# Patient Record
Sex: MALE | Race: WHITE | Employment: FULL TIME | URBAN - METROPOLITAN AREA
[De-identification: names, ages, dates, MRNs, and addresses within clinical notes are randomized per-mention and may not be internally consistent; named-entity substitution may affect disease eponyms.]

---

## 2022-02-12 ENCOUNTER — APPOINTMENT (OUTPATIENT)
Dept: GENERAL RADIOLOGY | Age: 57
End: 2022-02-12
Payer: COMMERCIAL

## 2022-02-12 ENCOUNTER — HOSPITAL ENCOUNTER (EMERGENCY)
Age: 57
Discharge: HOME | End: 2022-02-12
Payer: COMMERCIAL

## 2022-02-12 VITALS
TEMPERATURE: 98.1 F | WEIGHT: 215 LBS | HEIGHT: 70 IN | DIASTOLIC BLOOD PRESSURE: 99 MMHG | SYSTOLIC BLOOD PRESSURE: 156 MMHG | HEART RATE: 90 BPM | OXYGEN SATURATION: 99 % | BODY MASS INDEX: 30.78 KG/M2 | RESPIRATION RATE: 14 BRPM

## 2022-02-12 DIAGNOSIS — S83.91XA SPRAIN OF RIGHT KNEE, UNSPECIFIED LIGAMENT, INITIAL ENCOUNTER: Primary | ICD-10-CM
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
## 2022-02-12 PROCEDURE — 73560 X-RAY EXAM OF KNEE 1 OR 2: CPT

## 2022-02-12 PROCEDURE — 99284 EMERGENCY DEPT VISIT MOD MDM: CPT

## 2022-02-12 PROCEDURE — 6370000000 HC RX 637 (ALT 250 FOR IP): Performed by: PHYSICIAN ASSISTANT

## 2022-02-12 RX ORDER — IBUPROFEN 600 MG/1
600 TABLET ORAL ONCE
Status: COMPLETED | OUTPATIENT
Start: 2022-02-12 | End: 2022-02-12

## 2022-02-12 RX ADMIN — IBUPROFEN 600 MG: 600 TABLET ORAL at 12:50

## 2022-02-12 ASSESSMENT — PAIN DESCRIPTION - ONSET: ONSET: ON-GOING

## 2022-02-12 ASSESSMENT — PAIN DESCRIPTION - LOCATION: LOCATION: KNEE

## 2022-02-12 ASSESSMENT — PAIN DESCRIPTION - DESCRIPTORS: DESCRIPTORS: ACHING

## 2022-02-12 ASSESSMENT — PAIN DESCRIPTION - PROGRESSION: CLINICAL_PROGRESSION: NOT CHANGED

## 2022-02-12 ASSESSMENT — PAIN DESCRIPTION - ORIENTATION: ORIENTATION: RIGHT

## 2022-02-12 ASSESSMENT — PAIN DESCRIPTION - PAIN TYPE: TYPE: ACUTE PAIN

## 2022-02-12 ASSESSMENT — PAIN DESCRIPTION - FREQUENCY: FREQUENCY: CONTINUOUS

## 2022-02-12 ASSESSMENT — PAIN SCALES - GENERAL
PAINLEVEL_OUTOF10: 7
PAINLEVEL_OUTOF10: 6

## 2022-02-12 NOTE — ED NOTES
Pt instructed to follow up with [facility]. Assessed per Panfilo SERVIN.      Twin Lancaster LPN  [id number] [phone]

## 2022-02-12 NOTE — ED PROVIDER NOTES
[facility]  ED  eMERGENCY dEPARTMENT eNCOUnter        Pt Name: Sean Jean-Baptiste  MRN: [id number]  [place] 1965  Date of evaluation: 2/12/2022  Provider: Сергей Stone PA-C  PCP: No primary care provider on file. ED Attending: Wally Spear MD    This patient was not seen by the ED attending. History is provided by the patient    CHIEF COMPLAINT:  Knee Pain ([place] Lanes on ice getting out of car last night. Twisted right knee, unable to extend fully.)      HISTORY OF PRESENT ILLNESS:  Sean Jean-Baptiste is a 64 y.o. male who presents to the ED via private vehicle with complaints of right knee injury. This patient states he slipped and fell on ice last night while getting out of his car. He injured his right knee. He reports pain, swelling and difficulty with full extension since the incident. He gets into the ED with crutches. He is concerned he \"tore something\". He reports 7/10 \"aching\" pain that is constant and exacerbated with movement at the joint and weightbearing. He is not established with an orthopedist.  No other injuries sustained during this fall. No other complaints, modifying factors or associated symptoms. Nursing notes reviewed. Past Medical History:   Diagnosis Date    Hypertension      No past surgical history on file. No family history on file. Social History     Socioeconomic History    Marital status:      Spouse name: Not on file    Number of children: Not on file    Years of education: Not on file    Highest education level: Not on file   Occupational History    Not on file   Tobacco Use    Smoking status: Never Smoker    Smokeless tobacco: Not on file   Substance and Sexual Activity    Alcohol use:  Yes     Alcohol/week: 12.0 standard drinks     Types: 12 Cans of beer per week    Drug use: No    Sexual activity: Not on file   Other Topics Concern    Not on file   Social History Narrative    Not on file     Social Determinants of Health     Financial Resource Strain:     Difficulty of Paying Living Expenses: Not on file   Food Insecurity:     Worried About Running Out of Food in the Last Year: Not on file    Miguel Angel of Food in the Last Year: Not on file   Transportation Needs:     Lack of Transportation (Medical): Not on file    Lack of Transportation (Non-Medical): Not on file   Physical Activity:     Days of Exercise per Week: Not on file    Minutes of Exercise per Session: Not on file   Stress:     Feeling of Stress : Not on file   Social Connections:     Frequency of Communication with Friends and Family: Not on file    Frequency of Social Gatherings with Friends and Family: Not on file    Attends [employer] Services: Not on file    Active Member of [address] [place] [address] [employer] or Organizations: Not on file    Attends Club or Organization Meetings: Not on file    Marital Status: Not on file   Intimate Partner Violence:     Fear of Current or Ex-Partner: Not on file    Emotionally Abused: Not on file    Physically Abused: Not on file    Sexually Abused: Not on file   Housing Stability:     Unable to Pay for Housing in the Last Year: Not on file    Number of [place] in the Last Year: Not on file    Unstable Housing in the Last Year: Not on file     No current facility-administered medications for this encounter. No current outpatient medications on file. Allergies   Allergen Reactions    Pcn [Penicillins]     Sulfa Antibiotics        REVIEW OF SYSTEMS:  6 systems reviewed, pertinent positives per HPI otherwise noted to be negative. PHYSICAL EXAM:  BP (!) 156/99   Pulse 90   Temp 98.1 °F (36.7 °C) (Oral)   Resp 14   Ht 5' 10\" (1.778 m)   Wt 215 lb (97.5 kg)   SpO2 99%   BMI 30.85 kg/m²   CONSTITUTIONAL: Awake and alert. Well-developed. Well-nourished. Non-toxic. Cooperative. No acute distress. HENT: Normocephalic. Atraumatic. External ears normal, without discharge. Nose normal. Mucous membranes moist.  EYES: Conjunctiva non-injected.  No scleral icterus. PERRL. EOM's grossly intact. NECK: Supple. Normal ROM. CARDIOVASCULAR: Normal heart rate. Intact distal pulses. PULMONARY/CHEST WALL: Breathing is unlabored. Equal, symmetric chest rise. Speaking comfortably in full sentences. ABDOMEN: Nondistended  MUSKULOSKELETAL: Right knee: No acute deformity. No notable soft tissue swelling or joint effusion. No focal tenderness in palpating the joint. No evidence of ligament laxity or joint instability on manipulating joint. His range of motion is good in his extensor mechanism is intact. He does however have difficulty with full/complete extension. Outside of this, no injury/soft tissue swelling/tenderness to palpate. SKIN: Warm and dry. Skin intact. NEUROLOGICAL: Alert and oriented x 3. Strength is 5/5 in all extremities and sensation is intact. PSYCHIATRIC: Normal affect    Labs:    None    RADIOLOGY:    All x-ray studies are viewed/reviewed by me. Formal interpretations per the radiologist are as follows:      XR KNEE RIGHT (1-2 VIEWS)    Result Date: 2/12/2022  EXAMINATION: 2 XRAY VIEWS OF THE RIGHT KNEE 2/12/2022 12:45 pm COMPARISON: None. HISTORY: ORDERING SYSTEM PROVIDED HISTORY: pain s/p fall on ice TECHNOLOGIST PROVIDED HISTORY: Reason for exam:->pain s/p fall on ice Reason for Exam: Knee Pain ([place] on ice getting out of car last night. Twisted right knee, unable to extend fully.) FINDINGS: The alignment of the left knee is normal.  Tibial plateau is congruent. There is no acute fracture or dislocation. There is mild osteophyte formation of the patella. Soft tissues are unremarkable. No joint effusion is identified. No acute osseous abnormality. 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 ED COURSE/MDM:  Patient was given the following medications:  Medications   ibuprofen (ADVIL;MOTRIN) tablet 600 mg (600 mg Oral Given 2/12/22 1250)       I have evaluated this patient here in the ED.  Patient describes slipping on ice before falling and injuring his right knee last night. His exam is unremarkable. X-ray is done as more or less a screening. His x-ray shows no acute osseous abnormality. Mild ostial arthritis. Patient placed in knee immobilizer and given an NSAID here. He is told to ice, elevate and use over-the-counter analgesics at home for pain. Return precautions discussed. The pedis follow-up provided    I estimate there is LOW risk for ACUTE FRACTURE, COMPARTMENT SYNDROME, DEEP VENOUS THROMBOSIS, SEPTIC ARTHRITIS, TENDON OR NEUROVASCULAR INJURY, thus I consider the discharge disposition reasonable. Jackson Mendoza and I have discussed the diagnosis and risks, and we agree with discharging home to follow-up with their primary doctor or the referral orthopedist. We also discussed returning to the Emergency Department immediately if new or worsening symptoms occur. We have discussed the symptoms which are most concerning (e.g., changing or worsening pain, numbness, weakness) that necessitate immediate return. CLINICAL IMPRESSION:  1. Sprain of right knee, unspecified ligament, initial encounter    2. Fall from slipping on ice, initial encounter        Blood pressure (!) 156/99, pulse 90, temperature 98.1 °F (36.7 °C), temperature source Oral, resp. rate 14, height 5' 10\" (1.778 m), weight 215 lb (97.5 kg), SpO2 99 %. PATIENT REFERRED TO:  Bessy Dominique [address] [place] [address]  [phone]    Schedule an appointment as soon as possible for a visit           DISPOSITION  Patient was discharged to home in good condition.           Spencer Ahumada, [place]  02/12/22 [id number]